# Patient Record
Sex: MALE | Race: WHITE | Employment: FULL TIME | URBAN - METROPOLITAN AREA
[De-identification: names, ages, dates, MRNs, and addresses within clinical notes are randomized per-mention and may not be internally consistent; named-entity substitution may affect disease eponyms.]

---

## 2021-02-22 ENCOUNTER — HOSPITAL ENCOUNTER (EMERGENCY)
Age: 36
Discharge: HOME | End: 2021-02-22
Payer: COMMERCIAL

## 2021-02-22 VITALS
RESPIRATION RATE: 16 BRPM | OXYGEN SATURATION: 95 % | SYSTOLIC BLOOD PRESSURE: 139 MMHG | DIASTOLIC BLOOD PRESSURE: 78 MMHG | HEART RATE: 82 BPM | TEMPERATURE: 97.3 F

## 2021-02-22 DIAGNOSIS — S61.412A LACERATION OF LEFT HAND WITHOUT FOREIGN BODY, INITIAL ENCOUNTER: Primary | ICD-10-CM

## 2021-02-22 PROCEDURE — 6370000000 HC RX 637 (ALT 250 FOR IP): Performed by: NURSE PRACTITIONER

## 2021-02-22 PROCEDURE — 2500000003 HC RX 250 WO HCPCS: Performed by: NURSE PRACTITIONER

## 2021-02-22 PROCEDURE — 12002 RPR S/N/AX/GEN/TRNK2.6-7.5CM: CPT

## 2021-02-22 PROCEDURE — 99283 EMERGENCY DEPT VISIT LOW MDM: CPT

## 2021-02-22 RX ORDER — CEPHALEXIN 500 MG/1
500 CAPSULE ORAL 2 TIMES DAILY
Qty: 14 CAPSULE | Refills: 0 | Status: SHIPPED | OUTPATIENT
Start: 2021-02-22 | End: 2021-03-01

## 2021-02-22 RX ORDER — DIAPER,BRIEF,INFANT-TODD,DISP
EACH MISCELLANEOUS ONCE
Status: COMPLETED | OUTPATIENT
Start: 2021-02-22 | End: 2021-02-22

## 2021-02-22 RX ORDER — LIDOCAINE HYDROCHLORIDE 10 MG/ML
5 INJECTION, SOLUTION INFILTRATION; PERINEURAL ONCE
Status: COMPLETED | OUTPATIENT
Start: 2021-02-22 | End: 2021-02-22

## 2021-02-22 RX ADMIN — BACITRACIN ZINC 1 EACH: 500 OINTMENT TOPICAL at 12:54

## 2021-02-22 RX ADMIN — LIDOCAINE HYDROCHLORIDE 5 ML: 10 INJECTION, SOLUTION INFILTRATION; PERINEURAL at 12:56

## 2021-02-22 ASSESSMENT — PAIN SCALES - GENERAL: PAINLEVEL_OUTOF10: 5

## 2021-02-22 NOTE — ED PROVIDER NOTES
[address]  Department of Emergency Medicine   ED  Encounter Note  Admit Date/RoomTime: 2021 12:32 PM  ED Room: [facility]    NAME: Shani Joseph  : 1985  MRN: [id number]     Chief Complaint:  Laceration (left hand, between ring and pinky finger, tetanus UTD)    History of Present Illness       Shani Joseph is a 38 y.o. old male presenting to the emergency department complaint of laceration to left hand. Patient reports that just prior to his arrival he was at work and walked past a dry erase white board cabinet edge of it and cut his hand. He reports being left-hand dominant. States that his tetanus is up-to-date. Denies numbness, tingling, paresthesias, extremity weakness, or any other complaints at this time. No blood thinner use. There is not a possibility of retained foreign body in the affected area. Bleeding is  controlled. He takes no blood thinning agents. There is pain at injury site. Tetanus Status:  up to date. ROS   Pertinent positives and negatives are stated within HPI, all other systems reviewed and are negative. Past Medical History:  has no past medical history on file. Surgical History:  has no past surgical history on file. Social History:  reports that he has been smoking. He has been smoking about 1.00 pack per day. He has never used smokeless tobacco. He reports previous alcohol use. Family History: family history is not on file. Allergies: Patient has no known allergies. Physical Exam   Oxygen Saturation Interpretation: Normal.        ED Triage Vitals   BP Temp Temp src Pulse Resp SpO2 Height Weight   21 1231 21 1203 -- 21 1204 21 1204 21 1204 -- --   139/78 97.3 °F (36.3 °C)  82 16 95 %           Constitutional:  Alert, development consistent with age. Neck:  Normal ROM. Supple. Non-tender.   Hand: Left dorsal 4th distal aspect  Metacarpal .              Tenderness: mild.              Swelling: None. Deformity: no.             Skin: 4 cm laceration. No active bleeding. No drainage. No surrounding erythema. Neurovascular: Motor deficit: none. Sensory deficit: none. Pulse deficit: none. Capillary refill: normal.  Fingers:  4th            Tenderness:  none. Swelling: None. Deformity: no.              ROM: full range of motion. Full flexion and extension of all digits of left hand at the MCP, PIP, and DIP joints. Skin: Laceration extending into lateral aspect of fourth proximal digit. Distal to this area is a small skin avulsion. No active bleeding. No drainage. No surrounding erythema. No neurovascular deficits. No motor or sensory deficits. Compartments soft and compressible. Wrist:               Tenderness:  none. Swelling: None. Deformity: no.             ROM: full range of motion. Skin:  normal exam; no wounds, erythema, or swelling. Lymphatics: No lymphangitis or adenopathy noted. Neurological:  Oriented. Motor functions intact. t. Lab / Imaging Results   (All laboratory and radiology results have been personally reviewed by myself)  Labs:  No results found for this visit on 02/22/21. Imaging: All Radiology results interpreted by Radiologist unless otherwise noted. No orders to display     ED Course / Medical Decision Making     Medications   lidocaine 1 % injection 5 mL (5 mLs Intradermal Given 2/22/21 1256)   bacitracin zinc ointment (1 each Topical Given 2/22/21 1254)        Consult(s):   None    Procedure(s):   PROCEDURE NOTE  2/22/21         LACERATION REPAIR  Risks, benefits and alternatives (for applicable procedures below) described. Performed By: DARLENE Vaughn CNP. Laceration #: 1. Location: left 4th distal metacarpal into left MC joint  Length: 4 cm.   The wound area was cleansend with cesario-clens and draped in a sterile fashion. Local Anesthesia:  Lidocaine 1% without epinephrine. The wound was explored with the following results:  no foreign body or tendon injury seen. Debridement: None. Undermining: None. Wound Margins Revised: None. Flaps Aligned: no. The wound was closed with 4-0 Ethilon using interrupted sutures. Dressing:  bacitracin and a sterile dressing. There were no additional wounds requiring formal closure. Total number suture:  7. MDM:   Yevgeniy Howe is a 57-year-old male who presented to the emergency department with complaint of laceration to left hand. Incident occurred prior to his arrival while at work. He reported walking past a white dry erase board and hitting his hand on the edge of the board, which was sharp. Patient reported being left-hand dominant. His tetanus was up-to-date. No neurovascular deficits. No motor or sensory deficits. He had full flexion extension of all digits of left hand at the MCP, PIP, and DIP joints. Laceration was repaired as stated above. He was given contact information to follow-up with corporate care for full release back to work. Patient verbalized understanding agrees with plan. Keflex was prescribed due to laceration extending over left fourth MCP joint. He had a small skin avulsion as well. Wound care was provided and wound care instructions discussed. Patient verbalized understanding. He remained hemodynamically stable, nontoxic, and appropriate for outpatient management. There was no concern for possibility of retained foreign body. No active bleeding. Not on blood thinners. Advised to return for any new, change, worsening symptoms or concerns  At this time the patient is without objective evidence of an acute process requiring hospitalization or inpatient management. They have remained hemodynamically stable throughout their entire ED visit and are stable for discharge with outpatient follow-up.      The plan